# Patient Record
Sex: MALE | Race: WHITE | NOT HISPANIC OR LATINO | Employment: FULL TIME | URBAN - METROPOLITAN AREA
[De-identification: names, ages, dates, MRNs, and addresses within clinical notes are randomized per-mention and may not be internally consistent; named-entity substitution may affect disease eponyms.]

---

## 2017-02-20 ENCOUNTER — OFFICE VISIT (OUTPATIENT)
Dept: URGENT CARE | Facility: CLINIC | Age: 32
End: 2017-02-20
Payer: COMMERCIAL

## 2017-02-20 PROCEDURE — G0382 LEV 3 HOSP TYPE B ED VISIT: HCPCS

## 2017-02-20 PROCEDURE — 99283 EMERGENCY DEPT VISIT LOW MDM: CPT

## 2017-08-14 ENCOUNTER — GENERIC CONVERSION - ENCOUNTER (OUTPATIENT)
Dept: OTHER | Facility: OTHER | Age: 32
End: 2017-08-14

## 2017-08-17 ENCOUNTER — LAB CONVERSION - ENCOUNTER (OUTPATIENT)
Dept: OTHER | Facility: OTHER | Age: 32
End: 2017-08-17

## 2017-08-17 LAB
A/G RATIO (HISTORICAL): 2.6 (CALC) (ref 1–2.5)
ALBUMIN SERPL BCP-MCNC: 5 G/DL (ref 3.6–5.1)
ALP SERPL-CCNC: 53 U/L (ref 40–115)
ALT SERPL W P-5'-P-CCNC: 14 U/L (ref 9–46)
AST SERPL W P-5'-P-CCNC: 20 U/L (ref 10–40)
BACTERIA UR QL AUTO: NORMAL /HPF
BASOPHILS # BLD AUTO: 0.6 %
BASOPHILS # BLD AUTO: 31 CELLS/UL (ref 0–200)
BILIRUB SERPL-MCNC: 0.5 MG/DL (ref 0.2–1.2)
BILIRUB UR QL STRIP: NEGATIVE
BUN SERPL-MCNC: 9 MG/DL (ref 7–25)
BUN/CREA RATIO (HISTORICAL): ABNORMAL (CALC) (ref 6–22)
CALCIUM SERPL-MCNC: 9.8 MG/DL (ref 8.6–10.3)
CHLORIDE SERPL-SCNC: 97 MMOL/L (ref 98–110)
CHOLEST SERPL-MCNC: 195 MG/DL (ref 125–200)
CHOLEST/HDLC SERPL: 2.3 (CALC)
CO2 SERPL-SCNC: 30 MMOL/L (ref 20–31)
COLOR UR: YELLOW
COMMENT (HISTORICAL): CLEAR
CREAT SERPL-MCNC: 0.84 MG/DL (ref 0.6–1.35)
CULTURE RESULT (HISTORICAL): NORMAL
DEPRECATED RDW RBC AUTO: 13.1 % (ref 11–15)
EGFR AFRICAN AMERICAN (HISTORICAL): 133 ML/MIN/1.73M2
EGFR-AMERICAN CALC (HISTORICAL): 115 ML/MIN/1.73M2
EOSINOPHIL # BLD AUTO: 120 CELLS/UL (ref 15–500)
EOSINOPHIL # BLD AUTO: 2.3 %
FECAL OCCULT BLOOD DIAGNOSTIC (HISTORICAL): NEGATIVE
GAMMA GLOBULIN (HISTORICAL): 1.9 G/DL (CALC) (ref 1.9–3.7)
GLUCOSE (HISTORICAL): 103 MG/DL (ref 65–99)
GLUCOSE (HISTORICAL): NEGATIVE
HCT VFR BLD AUTO: 44.9 % (ref 38.5–50)
HDLC SERPL-MCNC: 86 MG/DL
HGB BLD-MCNC: 15.1 G/DL (ref 13.2–17.1)
HYALINE CASTS #/AREA URNS LPF: NORMAL /LPF
KETONES UR STRIP-MCNC: NEGATIVE MG/DL
LDL CHOLESTEROL (HISTORICAL): 96 MG/DL (CALC)
LEUKOCYTE ESTERASE UR QL STRIP: NEGATIVE
LYMPHOCYTES # BLD AUTO: 1929 CELLS/UL (ref 850–3900)
LYMPHOCYTES # BLD AUTO: 37.1 %
MCH RBC QN AUTO: 32.5 PG (ref 27–33)
MCHC RBC AUTO-ENTMCNC: 33.6 G/DL (ref 32–36)
MCV RBC AUTO: 96.8 FL (ref 80–100)
MONOCYTES # BLD AUTO: 515 CELLS/UL (ref 200–950)
MONOCYTES (HISTORICAL): 9.9 %
NEUTROPHILS # BLD AUTO: 2605 CELLS/UL (ref 1500–7800)
NEUTROPHILS # BLD AUTO: 50.1 %
NITRITE UR QL STRIP: NEGATIVE
NON-HDL-CHOL (CHOL-HDL) (HISTORICAL): 109 MG/DL (CALC)
PH UR STRIP.AUTO: 7 [PH] (ref 5–8)
PLATELET # BLD AUTO: 195 THOUSAND/UL (ref 140–400)
PMV BLD AUTO: 10.2 FL (ref 7.5–12.5)
POTASSIUM SERPL-SCNC: 5.1 MMOL/L (ref 3.5–5.3)
PROT UR STRIP-MCNC: NEGATIVE MG/DL
RBC # BLD AUTO: 4.64 MILLION/UL (ref 4.2–5.8)
RBC (HISTORICAL): NORMAL /HPF
SODIUM SERPL-SCNC: 134 MMOL/L (ref 135–146)
SP GR UR STRIP.AUTO: 1.01 (ref 1–1.03)
SQUAMOUS EPITHELIAL CELLS (HISTORICAL): NORMAL /HPF
TOTAL PROTEIN (HISTORICAL): 6.9 G/DL (ref 6.1–8.1)
TRIGL SERPL-MCNC: 64 MG/DL
TSH SERPL DL<=0.05 MIU/L-ACNC: 1.94 MIU/L (ref 0.4–4.5)
WBC # BLD AUTO: 5.2 THOUSAND/UL (ref 3.8–10.8)
WBC # BLD AUTO: NORMAL /HPF

## 2017-08-18 ENCOUNTER — ALLSCRIPTS OFFICE VISIT (OUTPATIENT)
Dept: OTHER | Facility: OTHER | Age: 32
End: 2017-08-18

## 2017-09-11 ENCOUNTER — TRANSCRIBE ORDERS (OUTPATIENT)
Dept: ADMINISTRATIVE | Facility: HOSPITAL | Age: 32
End: 2017-09-11

## 2017-09-11 ENCOUNTER — ALLSCRIPTS OFFICE VISIT (OUTPATIENT)
Dept: OTHER | Facility: OTHER | Age: 32
End: 2017-09-11

## 2017-09-11 DIAGNOSIS — R20.0 TACTILE ANESTHESIA: ICD-10-CM

## 2017-09-11 DIAGNOSIS — R20.2 PARESTHESIA: Primary | ICD-10-CM

## 2017-12-20 ENCOUNTER — ALLSCRIPTS OFFICE VISIT (OUTPATIENT)
Dept: OTHER | Facility: OTHER | Age: 32
End: 2017-12-20

## 2017-12-20 DIAGNOSIS — M22.2X2 PATELLOFEMORAL DISORDER OF LEFT KNEE: ICD-10-CM

## 2017-12-20 DIAGNOSIS — M22.2X1 PATELLOFEMORAL DISORDER OF RIGHT KNEE: ICD-10-CM

## 2017-12-20 DIAGNOSIS — G56.03 BILATERAL CARPAL TUNNEL SYNDROME: ICD-10-CM

## 2017-12-21 NOTE — PROGRESS NOTES
Assessment   1  Bilateral carpal tunnel syndrome (354 0) (G56 03)   2  Cubital tunnel syndrome on right (354 2) (G56 21)   3  Cubital tunnel syndrome on left (354 2) (G56 22)    Plan   Cubital tunnel syndrome on left    · EMG TWO EXTREMITIES WITH OR W/O RELATED PARASPINAL AREAS; Status:Hold    For - Scheduling; Requested for:[id number];   TWO : RUE  ONE : LUE   · Follow Up After Tests Complete Evaluation and Treatment  Follow-up  Status: Hold For -    Scheduling  Requested for: 68Mvn0197  Unlinked    · Follow-up PRN Evaluation and Treatment  Follow-up  Status: Complete  Done:    [id number]    Discussion/Summary      26-year-old male with bilateral carpal tunnel syndrome and cubital tunnel syndrome  We will get EMG use of his bilateral upper extremities  He will follow up with me after the EMG is a complete with x-rays of the bilateral wrists  documentation was recorded using voice recognition software      Chief Complaint   Bilateral wrist pain and numbness      History of Present Illness   HPI: 26-year-old male complaining of numbness from his elbows all the way down to his hands  This has been going on now for months  Had bilateral carpal tunnel injections done back in September which gave him some relief from the numbness in the shooting pain around the wrist, but now has returned  He also complains of some crunching around the wrist and a weird sensation when he pulls his gloves off  He has not had any knee EMGs  He were to wrist splints for a week, but it made the problem worse  He was waking up in the middle of the night  He is right-hand dominant  Review of Systems        Constitutional: No fever or chills, feels well, no tiredness, no recent weight loss or weight gain  Eyes: No complaints of red eyes, no eyesight problems  ENT: no complaints of loss of hearing, no nosebleeds, no sore throat        Cardiovascular: No complaints of chest pain, no palpitations, no leg claudication or lower extremity edema  Respiratory: No complaints of shortness of breath, no wheezing, no cough  Gastrointestinal: No complaints of abdominal pain, no constipation, no nausea or vomiting, no diarrhea or bloody stools  Genitourinary: No complaints of dysuria or incontinence, no hesitancy, no nocturia  Musculoskeletal: as noted in HPI  Integumentary: No complaints of skin rash or lesion, no itching or dry skin, no skin wounds  Neurological: numbness-- and-- tingling, but-- No complaints of headache, no confusion, no numbness or tingling, no dizziness  Psychiatric: No suicidal thoughts, no anxiety, no depression  Endocrine: No muscle weakness, no frequent urination, no excessive thirst, no feelings of weakness  ROS reviewed  Active Problems   1  Bilateral carpal tunnel syndrome (354 0) (G56 03)   2  Cigarette nicotine dependence without complication (472 8) (F01 755)   3  Numbness and tingling in left upper extremity (782 0) (R20 0,R20 2)   4  Numbness and tingling of right upper extremity (782 0) (R20 0,R20 2)    Past Medical History    · History of Knee injury, right, initial encounter (959 7) (B09 66NK)    Surgical History    · History of Knee Arthroscopy With Lateral Meniscus Repair    Family History   Mother    · Family history of hypertension (V17 49) (Z82 49)  Sister    · Family history of von Willebrand disease (V18 3) (Z83 2)    Social History    · Current every day smoker (305 1) (F17 200)   · Currently working   ·    · Social alcohol use (Z78 9)    Current Meds    1  B Complete Oral Tablet Recorded   2  Iron 325 (65 Fe) MG Oral Tablet Recorded   3  Multi-Vitamin Oral Tablet Recorded   4  Vitamin C 500 MG Oral Capsule Recorded   5  Vitamin D3 2000 UNIT Oral Capsule Recorded     The medication list was reviewed and updated today  Allergies   1   No Known Drug Allergies    Vitals   Signs   Heart Rate: 98  Systolic: 230  Diastolic: 80  Height: 5 ft 10 in  Weight: 146 lb 2 oz  BMI Calculated: 20 97  BSA Calculated: 1 83    Physical Exam      Right Elbow: Appearance: Normal  Tenderness: cubital tunnel  ROM: Full  Motor: Normal     Left Elbow: Appearance: Normal  Tenderness: cubital tunnel  Right Wrist: Appearance: Normal  Tenderness: None  ROM: Full  Motor: Normal -- decreased  strength  Special Tests: positive Phalen's test,-- positive Tinel's sign at the carpal tunnel-- and-- positive Tinel's sign at the ulnar groove  Left Wrist: Appearance: Normal  Tenderness: None  ROM: Full  Decreased  strength  Special Tests: positive Phalen's Test,-- positive Tinel's Sign over the carpal tunnel-- and-- positive Tinel's Sign at the ulnar groove        Signatures    Electronically signed by : Denise Jade MD; Dec 20 2017  4:00PM EST                       (Author)

## 2017-12-26 ENCOUNTER — TRANSCRIBE ORDERS (OUTPATIENT)
Dept: ADMINISTRATIVE | Facility: HOSPITAL | Age: 32
End: 2017-12-26

## 2017-12-26 DIAGNOSIS — R20.2 PARESTHESIA: ICD-10-CM

## 2017-12-26 DIAGNOSIS — R20.0 TACTILE ANESTHESIA: Primary | ICD-10-CM

## 2017-12-27 ENCOUNTER — HOSPITAL ENCOUNTER (OUTPATIENT)
Dept: NEUROLOGY | Facility: HOSPITAL | Age: 32
Discharge: HOME | End: 2017-12-30
Payer: COMMERCIAL

## 2017-12-27 DIAGNOSIS — G56.22 ULNAR NEUROPATHY AT ELBOW OF LEFT UPPER EXTREMITY: ICD-10-CM

## 2017-12-27 DIAGNOSIS — R20.2 PARESTHESIA: ICD-10-CM

## 2017-12-27 DIAGNOSIS — R20.0 TACTILE ANESTHESIA: ICD-10-CM

## 2017-12-27 DIAGNOSIS — G56.03 CARPAL TUNNEL SYNDROME, BILATERAL: ICD-10-CM

## 2017-12-27 PROCEDURE — 95913 NRV CNDJ TEST 13/> STUDIES: CPT

## 2017-12-27 PROCEDURE — 95886 MUSC TEST DONE W/N TEST COMP: CPT

## 2018-01-11 NOTE — CONSULTS
Chief Complaint    1  Hand Problem  Chief Complaint Free Text Note Form: Patient presents for numbness and tingling of hands and fingers times "several years"  History of Present Illness  HPI: Patient is a 78-year-old, right-hand-dominant, male who presents for numbness and tingling of both hands and fingers times several years  He notes symptoms have worsened over the last proximally 6 months  He reports "fingers going to sleep" in both hands with left greater than the right  He states symptoms are worse in his thumb index and long fingers  The symptoms worsen in the p m  but do not wake him from sleep  She reports associated weakness in his  strength  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  He denies any related trauma, or past surgical history  Review of Systems  Focused-Male Orthopedics:   Constitutional: no fever, not feeling poorly and no chills  Eyes: eyes not red and no eyesight problems  ENT: no hearing loss, no nosebleeds and no sore throat  Cardiovascular: no chest pain and no palpitations  Respiratory: no shortness of breath and no cough  Gastrointestinal: no abdominal pain  Musculoskeletal: as noted in HPI  Integumentary: no rashes  Neurological: as noted in HPI  Active Problems    1  Bilateral carpal tunnel syndrome (354 0) (G56 03)   2  Cigarette nicotine dependence without complication (598 1) (F86 690)    Past Medical History    1  History of Knee injury, right, initial encounter (959 7) (O14 75TT)    Surgical History    1  History of Knee Arthroscopy With Lateral Meniscus Repair    Family History    1  Family history of hypertension (V17 49) (Z82 49)    2  Family history of von Willebrand disease (V18 3) (Z83 2)    Social History    · Current every day smoker (305 1) (F17 200)   · Currently working   ·    · Social alcohol use (Z78 9)    Current Meds   1  B Complete Oral Tablet Recorded   2   Iron 325 (65 Fe) MG Oral Tablet Recorded   3  Multi-Vitamin Oral Tablet Recorded   4  Vitamin C 500 MG Oral Capsule Recorded   5  Vitamin D3 2000 UNIT Oral Capsule Recorded    Allergies    1  No Known Drug Allergies    Vitals  Signs   Recorded: 11Sep2017 01:08PM   Heart Rate: 97  Systolic: 362  Diastolic: 83  Height: 5 ft 10 in  Weight: 146 lb 2 oz  BMI Calculated: 20 97  BSA Calculated: 1 83    Physical Exam      General: No acute distress, age-appropriate  Cardiovascular: No discernable arrhythmia  Respiratory: Breathing is even and unlabored, no stridor or audible wheezing  Left Basic:   Left hand/wrist: There is no gross deformity ecchymosis or erythema  Positive Tinel's at the wrist  Positive compression test  Slight decrease to light sensation in the thumb, index, and long fingers  Brisk capillary refill  2+ radial and ulnar pulses  Right Basic:   Right hand/wrist  There is no gross deformity, erythema, ecchymosis, or edema  Positive Tinel's at wrist  Positive compression test  There is decreased to light touch in the thumb index and long fingers  There is full composite fist formation  There is no thenar atrophy  2+ radial and ulnar pulses  Brisk capillary refill  Procedure           (Corticosteroid injection containing 1% lidocaine and betamethasone injected into both carpal tunnels  Patient tolerated the procedure well no complications)            Assessment    1  Numbness and tingling in left upper extremity (782 0) (R20 0,R20 2)   2  Numbness and tingling of right upper extremity (782 0) (R20 0,R20 2)    Plan  Numbness and tingling in left upper extremity, Numbness and tingling of right upper  extremity    1  EMG TWO EXTREMITIES WITH OR W/O RELATED PARASPINAL AREAS; Status:Hold For   - Scheduling; Requested for:11Sep2017;   TWO : LUPAT  ONE : RUE   2   [facility] Instruction Sheet Steroid Injection Given; Status:Complete;   Done: 33HEB3065    Discussion/Summary  Discussion Summary:   Conservative versus nonconservative treatment options discussed with patient, risks versus benefits and prognosis  At this time, we will opt for conservative treatment to include volar wrist splinting and corticosteroid injections to bilateral carpal tunnels  Patient's insurance will not cover volar wrist splints, so he will wear OTC splints as available  Weightbearing and activities as tolerated  Follow-up as needed depending EMG for reevaluation        Signatures   Electronically signed by : ANNETTE Gomez; Sep 11 2017  2:00PM EST                       (Author)    Electronically signed by : NELSON Carlton ; Sep 11 2017  4:17PM EST

## 2018-01-12 ENCOUNTER — GENERIC CONVERSION - ENCOUNTER (OUTPATIENT)
Dept: OTHER | Facility: OTHER | Age: 33
End: 2018-01-12

## 2018-01-12 ENCOUNTER — APPOINTMENT (OUTPATIENT)
Dept: RADIOLOGY | Facility: CLINIC | Age: 33
End: 2018-01-12
Payer: COMMERCIAL

## 2018-01-12 VITALS
SYSTOLIC BLOOD PRESSURE: 128 MMHG | BODY MASS INDEX: 20.62 KG/M2 | HEIGHT: 70 IN | RESPIRATION RATE: 16 BRPM | DIASTOLIC BLOOD PRESSURE: 78 MMHG | WEIGHT: 144 LBS | HEART RATE: 78 BPM | TEMPERATURE: 98.7 F

## 2018-01-12 DIAGNOSIS — G56.03 BILATERAL CARPAL TUNNEL SYNDROME: ICD-10-CM

## 2018-01-12 PROCEDURE — 73110 X-RAY EXAM OF WRIST: CPT

## 2018-01-12 NOTE — PROGRESS NOTES
Assessment   1  Encounter for preventive health examination (V70 0) (Z00 00)    Plan  Bilateral carpal tunnel syndrome    · 1 - Tootie MENDOZA, Aryan Kaiser  (Orthopedic Surgery) Co-Management  *  Status: Active   Requested for: 86SUD1291  () Care Summary provided  : Yes  Health Maintenance    · Call [phone] if: You have any warning signs of skin cancer ; Status:Complete;    Done: 33XTE0864   · Always use a seat belt and shoulder strap when riding or driving a motor vehicle ;  Status:Complete;   Done: 97EFG0759   · Begin a limited exercise program ; Status:Complete;   Done: 59BIL5305   · Begin or continue regular aerobic exercise  Gradually work up to at least 3 sessions of 30  minutes of exercise a week ; Status:Complete;   Done: 03NAD1640   · Brush your teeth 3 times a day and floss at least once a day ; Status:Complete;   Done:  40LPU8598   · Drink plenty of fluids ; Status:Complete;   Done: 79YYC7694   · Eat a low fat and low cholesterol diet ; Status:Complete;   Done: 85GQG8261   · Limit your use of alcohol to 2 drinks or cans of beer a day ; Status:Complete;   Done:  18WHL8803   · Regular aerobic exercise can help reduce stress ; Status:Complete;   Done: 88LOM4846   · Use a sun block product with an SPF of 15 or more ; Status:Complete;   Done:  71VXD5128   · We recommend routine visits to a dentist ; Status:Complete;   Done: 26HGB2081   · We recommend that you bring your body mass index down to 26 ; Status:Complete;    Done: 79BYZ2121   · We recommend that you follow the "Mediterranean diet "; Status:Complete;   Done:  19RAL5371   · We recommend that you follow these rules for gun safety ; Status:Complete;   Done:  38FIL0097   · You need to quit smoking ; Status:Complete;   Done: 27HFK0424   · You need to stop smoking  Though it is not easy, more than half of all adult smokers  have quit  We encourage you to write down all the reasons you should quit smoking and  set a quit date for yourself    Ask us how we can help  You may also call  4-842-QUITNOW for free resources and assistance ; Status:Complete;   Done:  80GLI7912    Discussion/Summary  Impression: health maintenance visit  Currently, he eats an adequate diet and has an inadequate exercise regimen  Prostate cancer screening: the risks and benefits of prostate cancer screening were discussed and PSA is not indicated  Testicular cancer screening: the risks and benefits of testicular cancer screening were discussed and self testicular exam technique was taught  Colorectal cancer screening: colorectal cancer screening is not indicated  Screening lab work includes glucose, lipid profile and urinalysis  The risks and benefits of immunizations were discussed and immunizations are up to date  Advice and education were given regarding nutrition, aerobic exercise and weight bearing exercise  Patient discussion: discussed with the patient  25-year-old man here today for comprehensive physical examination  Examination is normal  He has some headaches and some neck pain and some back pain issues which are not disabling and I suggest that he return of the later date to discuss and review any of these issues when they become more of a problem  We had a 5 minute discussion regarding smoking cessation and he is motivated and interested to quit at this time  I have suggested to him that he try to slowly wean off of cigarettes a few less every week to 10 days and if need be he can purchase nicotinic come  If this is not working out and he is unsuccessful he will return and we will talk about further treatment including pharmacotherapy  He will get back to me in a month or so about this  Chief Complaint  physical exam, 32 yo  History of Present Illness  HM, Adult Male: The patient is being seen for a health maintenance evaluation  The last health maintenance visit was 5 year(s) ago  Social History: Household members include spouse  He is    Work status: working full time  The patient is a current cigarette smoker  He reports occasional alcohol use  He has never used illicit drugs  General Health: The patient's health since the last visit is described as good  He has regular dental visits  He denies vision problems  He denies hearing loss  Immunizations status: up to date   Patient states he had a tetanus booster 3 years ago from an injury he will get the date for may  Reproductive health:  the patient is sexually active  Screening: cancer screening reviewed and current  Prostate cancer screening includes no previous evaluation  Testicular cancer screening includes monthly self testicular examinations  Colorectal cancer screening includes no previous screening  metabolic screening reviewed and current  risk screening reviewed and current  HPI: 78-year-old man here today for comprehensive physical examination  He has not had a checkup for quite some time  He is had some blood work which I reviewed  He is a smoker      Review of Systems    Constitutional: No fever or chills, feels well, no tiredness, no recent weight gain or weight loss  Eyes: No complaints of eye pain, no red eyes, no discharge from eyes, no itchy eyes  ENT: no complaints of earache, no hearing loss, no nosebleeds, no nasal discharge, no sore throat, no hoarseness  Cardiovascular: No complaints of slow heart rate, no fast heart rate, no chest pain, no palpitations, no leg claudication, no lower extremity  Respiratory: No complaints of shortness of breath, no wheezing, no cough, no SOB on exertion, no orthopnea or PND  Gastrointestinal: No complaints of abdominal pain, no constipation, no nausea or vomiting, no diarrhea or bloody stools  Genitourinary: No complaints of dysuria, no incontinence, no hesitancy, no nocturia, no genital lesion, no testicular pain  Musculoskeletal: Low back pain nondisabling intermittent     Integumentary: No complaints of skin rash or skin lesions, no itching, no skin wound, no dry skin  Neurological: headache and Dull frontal headaches happening more often lately  Psychiatric: Is not suicidal, no sleep disturbances, no anxiety or depression, no change in personality, no emotional problems  Endocrine: No complaints of proptosis, no hot flashes, no muscle weakness, no erectile dysfunction, no deepening of the voice, no feelings of weakness  Past Medical History    · History of Knee injury, right, initial encounter (959 7) (M90 65NY)    Surgical History    · History of Knee Arthroscopy With Lateral Meniscus Repair    Family History  Mother    · Family history of hypertension (V17 49) (Z82 49)  Sister    · Family history of von Willebrand disease (V18 3) (Z83 2)    Social History    · Current every day smoker (305 1) (F17 200)   · Currently working   ·    · Social alcohol use (Z78 9)    Current Meds  1  B Complete Oral Tablet Recorded  2  Iron 325 (65 Fe) MG Oral Tablet Recorded  3  Multi-Vitamin Oral Tablet Recorded  4  Vitamin C 500 MG Oral Capsule Recorded  5  Vitamin D3 2000 UNIT Oral Capsule Recorded    Allergies   1  No Known Drug Allergies    Vitals   Recorded: 30ESN2864 12:53PM   Temperature 98 7 F, Tympanic   Heart Rate 78   Respiration 16   Systolic 281, LUE, Sitting   Diastolic 78, LUE, Sitting   Height 5 ft 10 in   Weight 144 lb    BMI Calculated 20 66   BSA Calculated 1 82     Physical Exam    Constitutional   General appearance: No acute distress, well appearing and well nourished  Head and Face   Head and face: Normal     Palpation of the face and sinuses: No sinus tenderness  Eyes   Conjunctiva and lids: No erythema, swelling or discharge  Pupils and irises: Equal, round, reactive to light  Ophthalmoscopic examination: Normal fundi and optic discs  Ears, Nose, Mouth, and Throat   External inspection of ears and nose: Normal     Otoscopic examination: Tympanic membranes translucent with normal light reflex   Canals patent without erythema  Hearing: Normal     Nasal mucosa, septum, and turbinates: Normal without edema or erythema  Lips, teeth, and gums: Normal, good dentition  Oropharynx: Normal with no erythema, edema, exudate or lesions  Neck   Neck: Supple, symmetric, trachea midline, no masses  Thyroid: Normal, no thyromegaly  Pulmonary   Respiratory effort: No increased work of breathing or signs of respiratory distress  Percussion of chest: Normal     Palpation of chest: Normal     Auscultation of lungs: Clear to auscultation  Cardiovascular   Auscultation of heart: Normal rate and rhythm, normal S1 and S2, no murmurs  Carotid pulses: 2+ bilaterally  Abdominal aorta: Normal     Femoral pulses: 2+ bilaterally  Pedal pulses: 2+ bilaterally  Peripheral vascular exam: Normal     Examination of extremities for edema and/or varicosities: Normal     Chest   Breasts: Normal, no dimpling or skin changes appreciated  Palpation of breasts and axillae: Normal, no masses palpated  Chest: Normal     Abdomen   Abdomen: Non-tender, no masses  Liver and spleen: No hepatomegaly or splenomegaly  Examination for hernias: No hernias appreciated  Genitourinary   Scrotal contents: Normal testes, no masses  Penis: Normal, no lesions  Lymphatic   Palpation of lymph nodes in neck: No lymphadenopathy  Palpation of lymph nodes in axillae: No lymphadenopathy  Palpation of lymph nodes in groin: No lymphadenopathy  Palpation of lymph nodes in other areas: No lymphadenopathy  Musculoskeletal   Gait and station: Normal     Inspection/palpation of digits and nails: Normal without clubbing or cyanosis  Inspection/palpation of joints, bones, and muscles: Normal     Range of motion: Normal     Stability: Normal     Muscle strength/tone: Normal     Skin   Skin and subcutaneous tissue: Normal without rashes or lesions  Palpation of skin and subcutaneous tissue: Normal turgor  Neurologic   Cranial nerves: Cranial nerves 2-12 intact  Cortical function: Normal mental status  Reflexes: 2+ and symmetric  Sensation: No sensory loss  Coordination: Normal finger to nose and heel to shin  Psychiatric   Judgment and insight: Normal     Orientation to person, place and time: Normal     Recent and remote memory: Intact  Mood and affect: Normal        Procedure    Procedure: Visual Acuity Test    Inforrmation supplied by a Snellen chart     Results: 20/25 in the right eye without corrective device, 20/25 in the left eye without corrective device      Signatures   Electronically signed by : NELSON Romero ; Aug 18 2017  1:45PM EST                       (Author)

## 2018-01-13 VITALS
WEIGHT: 146.13 LBS | DIASTOLIC BLOOD PRESSURE: 83 MMHG | SYSTOLIC BLOOD PRESSURE: 150 MMHG | HEIGHT: 70 IN | BODY MASS INDEX: 20.92 KG/M2 | HEART RATE: 97 BPM

## 2018-01-13 NOTE — CONSULTS
Chief Complaint    1  Hand Problem  Patient presents for numbness and tingling of hands and fingers times "several years"  History of Present Illness  HPI: Patient is a 57-year-old, right-hand-dominant, male who presents for numbness and tingling of both hands and fingers times several years  He notes symptoms have worsened over the last proximally 6 months  He reports "fingers going to sleep" in both hands with left greater than the right  He states symptoms are worse in his thumb index and long fingers  The symptoms worsen in the p m  but do not wake him from sleep  She reports associated weakness in his  strength  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  He denies any related trauma, or past surgical history  Review of Systems    Constitutional: no fever, not feeling poorly and no chills  Eyes: eyes not red and no eyesight problems  ENT: no hearing loss, no nosebleeds and no sore throat  Cardiovascular: no chest pain and no palpitations  Respiratory: no shortness of breath and no cough  Gastrointestinal: no abdominal pain  Musculoskeletal: as noted in HPI  Integumentary: no rashes  Neurological: as noted in HPI  Active Problems    1  Bilateral carpal tunnel syndrome (354 0) (G56 03)   2  Cigarette nicotine dependence without complication (403 4) (E48 436)    Past Medical History    · History of Knee injury, right, initial encounter (959 7) (R95 35CH)    Surgical History    · History of Knee Arthroscopy With Lateral Meniscus Repair    Family History    · Family history of hypertension (V17 49) (Z82 49)    · Family history of von Willebrand disease (V18 3) (Z83 2)    Social History    · Current every day smoker (305 1) (F17 200)   · Currently working   ·    · Social alcohol use (Z78 9)    Current Meds   1  B Complete Oral Tablet Recorded   2  Iron 325 (65 Fe) MG Oral Tablet Recorded   3   Multi-Vitamin Oral Tablet Recorded   4  Vitamin C 500 MG Oral Capsule Recorded   5  Vitamin D3 2000 UNIT Oral Capsule Recorded    Allergies    1  No Known Drug Allergies    Vitals  Signs    Heart Rate: 25ZDTLKPOA: 993CUZTOTLQI: 08INKVGR: 5 ft 10 inWeight: 146 lb 2 ozBMI Calculated: 20 97BSA Calculated: 1 83    Physical Exam      General: No acute distress, age-appropriate  Cardiovascular: No discernable arrhythmia  Respiratory: Breathing is even and unlabored, no stridor or audible wheezing  Left Basic:   Left hand/wrist: There is no gross deformity ecchymosis or erythema  Positive Tinel's at the wrist  Positive compression test  Slight decrease to light sensation in the thumb, index, and long fingers  Brisk capillary refill  2+ radial and ulnar pulses  Right Basic:   Right hand/wrist  There is no gross deformity, erythema, ecchymosis, or edema  Positive Tinel's at wrist  Positive compression test  There is decreased to light touch in the thumb index and long fingers  There is full composite fist formation  There is no thenar atrophy  2+ radial and ulnar pulses  Brisk capillary refill  Procedure           (Corticosteroid injection containing 1% lidocaine and betamethasone injected into both carpal tunnels  Patient tolerated the procedure well no complications)            Assessment    1  Numbness and tingling in left upper extremity (782 0) (R20 0,R20 2)   2  Numbness and tingling of right upper extremity (782 0) (R20 0,R20 2)    Plan     Numbness and tingling in left upper extremity, Numbness and tingling of right upper  extremity    · EMG TWO EXTREMITIES WITH OR W/O RELATED PARASPINAL AREAS; Status:Hold For  - Scheduling; Requested for:77Fhn9878;   TWO : GINA  ONE : ELISABET   · [facility] Instruction Sheet Steroid Injection Given; Status:Complete;   Done: 99TMY0110    Discussion/Summary    Conservative versus nonconservative treatment options discussed with patient, risks versus benefits and prognosis    At this time, we will opt for conservative treatment to include volar wrist splinting and corticosteroid injections to bilateral carpal tunnels  Patient's insurance will not cover volar wrist splints, so he will wear OTC splints as available  Weightbearing and activities as tolerated  Follow-up as needed depending EMG for reevaluation        Signatures   Electronically signed by : ANNETTE Alexander; Sep 11 2017  2:00PM EST                       (Author)    Electronically signed by : NELSON Ordaz ; Sep 11 2017  4:17PM EST

## 2018-01-23 VITALS
HEIGHT: 70 IN | DIASTOLIC BLOOD PRESSURE: 80 MMHG | WEIGHT: 146.13 LBS | BODY MASS INDEX: 20.92 KG/M2 | HEART RATE: 98 BPM | SYSTOLIC BLOOD PRESSURE: 142 MMHG

## 2018-01-24 VITALS
HEIGHT: 70 IN | WEIGHT: 146.13 LBS | HEART RATE: 80 BPM | DIASTOLIC BLOOD PRESSURE: 83 MMHG | SYSTOLIC BLOOD PRESSURE: 122 MMHG | BODY MASS INDEX: 20.92 KG/M2

## 2019-06-26 ENCOUNTER — OCCMED (OUTPATIENT)
Dept: URGENT CARE | Facility: CLINIC | Age: 34
End: 2019-06-26
Payer: OTHER MISCELLANEOUS

## 2019-06-26 DIAGNOSIS — S41.112A LACERATION OF ARM, LEFT, INITIAL ENCOUNTER: Primary | ICD-10-CM

## 2019-06-26 PROCEDURE — 12002 RPR S/N/AX/GEN/TRNK2.6-7.5CM: CPT | Performed by: EMERGENCY MEDICINE

## 2019-06-26 PROCEDURE — 99283 EMERGENCY DEPT VISIT LOW MDM: CPT | Performed by: EMERGENCY MEDICINE

## 2019-06-26 PROCEDURE — G0382 LEV 3 HOSP TYPE B ED VISIT: HCPCS | Performed by: EMERGENCY MEDICINE

## 2019-06-26 RX ORDER — LIDOCAINE HYDROCHLORIDE 10 MG/ML
5 INJECTION, SOLUTION EPIDURAL; INFILTRATION; INTRACAUDAL; PERINEURAL ONCE
Status: COMPLETED | OUTPATIENT
Start: 2019-06-26 | End: 2019-06-26

## 2019-06-26 RX ADMIN — LIDOCAINE HYDROCHLORIDE 5 ML: 10 INJECTION, SOLUTION EPIDURAL; INFILTRATION; INTRACAUDAL; PERINEURAL at 16:40

## 2019-07-03 ENCOUNTER — APPOINTMENT (OUTPATIENT)
Dept: URGENT CARE | Facility: CLINIC | Age: 34
End: 2019-07-03
Payer: OTHER MISCELLANEOUS

## 2019-07-03 PROCEDURE — 99213 OFFICE O/P EST LOW 20 MIN: CPT

## 2019-09-10 ENCOUNTER — OFFICE VISIT (OUTPATIENT)
Dept: URGENT CARE | Facility: CLINIC | Age: 34
End: 2019-09-10
Payer: COMMERCIAL

## 2019-09-10 VITALS
DIASTOLIC BLOOD PRESSURE: 80 MMHG | WEIGHT: 141.8 LBS | SYSTOLIC BLOOD PRESSURE: 130 MMHG | BODY MASS INDEX: 20.3 KG/M2 | RESPIRATION RATE: 20 BRPM | HEIGHT: 70 IN | OXYGEN SATURATION: 98 % | HEART RATE: 79 BPM | TEMPERATURE: 99.2 F

## 2019-09-10 DIAGNOSIS — Z00.00 ROUTINE PHYSICAL EXAMINATION: Primary | ICD-10-CM

## 2019-09-10 NOTE — PATIENT INSTRUCTIONS
Routine yearly physical examination    Two recommendations, he needs to quit smoking he needs to do regular dental visits

## 2019-09-10 NOTE — PROGRESS NOTES
[facility]        NAME: Bart Cardoza is a 26 y o  male  : 1985    MRN: [id number]  DATE: September 10, 2019  TIME: 4:56 PM    Assessment and Plan   Routine physical examination [Z00 00]  1  Routine physical examination           Patient Instructions       Follow up with PCP in 3-5 days  Proceed to  ER if symptoms worsen  Chief Complaint     Chief Complaint   Patient presents with    Annual Exam     annual work physical         History of Present Illness       Here for routine physical   Negative past medical history though he is a cigarette smoker  Review of Systems   Review of Systems   All other systems reviewed and are negative  Current Medications     No current outpatient medications on file  Current Allergies     Allergies as of 09/10/2019    (No Known Allergies)            The following portions of the patient's history were reviewed and updated as appropriate: allergies, current medications, past family history, past medical history, past social history, past surgical history and problem list      History reviewed  No pertinent past medical history  History reviewed  No pertinent surgical history  No family history on file  Medications have been verified  Objective   /80   Pulse 79   Temp 99 2 °F (37 3 °C)   Resp 20   Ht 5' 10" (1 778 m)   Wt 64 3 kg (141 lb 12 8 oz)   SpO2 98%   BMI 20 35 kg/m²        Physical Exam     Physical Exam   Constitutional: He is oriented to person, place, and time  HENT:   Right Ear: External ear normal    Left Ear: External ear normal    Mouth/Throat: Oropharynx is clear and moist    Eyes: Pupils are equal, round, and reactive to light  Neck: Normal range of motion  No thyromegaly present  Cardiovascular: Regular rhythm and normal heart sounds  Pulmonary/Chest: Breath sounds normal    Abdominal: Soft  He exhibits no mass  There is no guarding  Musculoskeletal: Normal range of motion  Lymphadenopathy:     He has no cervical adenopathy  Neurological: He is alert and oriented to person, place, and time  No cranial nerve deficit  Psychiatric: He has a normal mood and affect   His behavior is normal  Thought content normal